# Patient Record
Sex: MALE | Race: WHITE | HISPANIC OR LATINO | Employment: STUDENT | URBAN - METROPOLITAN AREA
[De-identification: names, ages, dates, MRNs, and addresses within clinical notes are randomized per-mention and may not be internally consistent; named-entity substitution may affect disease eponyms.]

---

## 2018-02-25 ENCOUNTER — OFFICE VISIT (OUTPATIENT)
Dept: URGENT CARE | Facility: CLINIC | Age: 18
End: 2018-02-25
Payer: MEDICAID

## 2018-02-25 VITALS
TEMPERATURE: 99 F | HEIGHT: 67 IN | DIASTOLIC BLOOD PRESSURE: 83 MMHG | BODY MASS INDEX: 36.1 KG/M2 | RESPIRATION RATE: 18 BRPM | OXYGEN SATURATION: 97 % | SYSTOLIC BLOOD PRESSURE: 138 MMHG | HEART RATE: 87 BPM | WEIGHT: 230 LBS

## 2018-02-25 DIAGNOSIS — S89.92XA KNEE INJURY, LEFT, INITIAL ENCOUNTER: Primary | ICD-10-CM

## 2018-02-25 PROCEDURE — 3008F BODY MASS INDEX DOCD: CPT | Mod: S$GLB,,, | Performed by: NURSE PRACTITIONER

## 2018-02-25 PROCEDURE — 99203 OFFICE O/P NEW LOW 30 MIN: CPT | Mod: S$GLB,,, | Performed by: NURSE PRACTITIONER

## 2018-02-25 RX ORDER — IBUPROFEN 600 MG/1
600 TABLET ORAL EVERY 6 HOURS PRN
Qty: 20 TABLET | Refills: 0 | Status: SHIPPED | OUTPATIENT
Start: 2018-02-25

## 2018-02-25 NOTE — PROGRESS NOTES
"Subjective:       Patient ID: José mcnair is a 17 y.o. male.    Vitals:  height is 5' 7" (1.702 m) and weight is 104.3 kg (230 lb). His tympanic temperature is 99.1 °F (37.3 °C). His blood pressure is 138/83 and his pulse is 87. His respiration is 18 and oxygen saturation is 97%.     Chief Complaint: Trauma and Knee Pain (1 hr)    Pt c/o leg becoming stiff and falling onto left knee forward.  No other injury.  States that he heard/felt a pop sensation twice.  No ankle or hip pain.  Pt has not previously injured this leg.  Here with his mom.  This did occur at work but patient is refusing workman's comp at this time.      Knee Injury   This is a new problem. The current episode started today. The problem occurs constantly. The problem has been unchanged. Associated symptoms include joint swelling. Pertinent negatives include no abdominal pain, chest pain, neck pain, numbness, vomiting or weakness. The symptoms are aggravated by standing and walking. He has tried nothing for the symptoms.     Review of Systems   Constitution: Negative for weakness and malaise/fatigue.   HENT: Negative for nosebleeds.    Cardiovascular: Negative for chest pain and syncope.   Respiratory: Negative for shortness of breath.    Musculoskeletal: Positive for joint pain, joint swelling and stiffness. Negative for back pain and neck pain.   Gastrointestinal: Negative for abdominal pain and vomiting.   Genitourinary: Negative for hematuria.   Neurological: Negative for dizziness and numbness.       Objective:      Physical Exam   Constitutional: He is oriented to person, place, and time. He appears well-developed and well-nourished. He is cooperative.  Non-toxic appearance. He does not appear ill. No distress.   HENT:   Head: Normocephalic and atraumatic.   Right Ear: Hearing and external ear normal.   Left Ear: Hearing and external ear normal.   Nose: Nose normal.   Mouth/Throat: Uvula is midline, oropharynx is clear and moist and " mucous membranes are normal.   Eyes: Conjunctivae and lids are normal. Right eye exhibits no discharge. Left eye exhibits no discharge. No scleral icterus.   Neck: Trachea normal, normal range of motion, full passive range of motion without pain and phonation normal. Neck supple.   Cardiovascular: Normal rate, regular rhythm, normal heart sounds, intact distal pulses and normal pulses.    Pulmonary/Chest: Effort normal and breath sounds normal. No respiratory distress.   Abdominal: Normal appearance and bowel sounds are normal.   Musculoskeletal: He exhibits no edema or deformity.        Left knee: He exhibits decreased range of motion and swelling. He exhibits no effusion, no ecchymosis, no deformity, no laceration, no erythema, normal alignment, no LCL laxity, normal patellar mobility, no bony tenderness, normal meniscus and no MCL laxity. Tenderness found.        Legs:  Neurological: He is alert and oriented to person, place, and time. He has normal strength. No cranial nerve deficit or sensory deficit. He exhibits normal muscle tone. Coordination and gait normal.   Antalgic gait   Skin: Skin is warm, dry and intact. No abrasion, no bruising and no ecchymosis noted. He is not diaphoretic. No pallor.   Psychiatric: He has a normal mood and affect. His speech is normal and behavior is normal. Judgment and thought content normal. Cognition and memory are normal.   Nursing note and vitals reviewed.      Crutches and ace wrap performed in clinic.  Xray down at this time.  Pt going to [place] location for xray.  Will call with result.    Narrative     Knee 3 view left    Clinical history: Fall    Comparison: None    Findings:  3 views.    There is a subtle linear lucency involving the lateral tibial spine, best seen on frontal view, not confirmed on orthogonal view, fracture is not excluded, clinical correlation is advised.  There may be a minimal suprapatellar effusion versus positioning.   Impression       As  above Impression:  As above      Electronically signed by: DEBORAH GUTHRIE MD  Date: 02/25/18  Time: 16:52      Pt returned to clinic for knee immobilizer for possible fracture seen on xray.  Pt given cd for follow up.  He has appt with his PCP tomorrow.  Assessment:       1. Knee injury, left, initial encounter        Plan:         Knee injury, left, initial encounter  -     Cancel: X-Ray Knee 3 View Left; Future; Expected date: 02/25/2018  -     CRUTCHES FOR HOME USE  -     ibuprofen (ADVIL,MOTRIN) 600 MG tablet; Take 1 tablet (600 mg total) by mouth every 6 (six) hours as needed for Pain.  Dispense: 20 tablet; Refill: 0  -     Ambulatory referral to Orthopedics  -     X-Ray Knee 3 View Left; Future; Expected date: 02/25/2018      Patient Instructions   Please go directly to [facility] at [address] [place] [address] for xrays.      Call [phone] to confirm appointment for referral to ortho.  If you are unable to get in with [facility] please follow up through your PCP.  Do not take any otc NSAIDs with the Ibuprofen prescribed to you.      Please drink plenty of fluids.  Please get plenty of rest.  Please return here or go to the Emergency Department for any concerns or worsening of condition.  If you were prescribed a narcotic medication, do not drive or operate heavy equipment or machinery while taking these medications.  If you were not prescribed an anti-inflammatory medication, and if you do not have any history of stomach/intestinal ulcers, or kidney disease, or are not taking a blood thinner such as Coumadin, Plavix, Pradaxa, Eloquis, or Xaralta for example, it is OK to take over the counter Ibuprofen or Advil or Motrin or Aleve as directed.  Do not take these medications on an empty stomach.  Rest, ice, compression and elevation to the affected joint or limb as needed.  Please follow up with your primary care doctor or specialist as needed.    If you  smoke,  please stop smoking.  Knee Sprain    A sprain is an injury to the ligaments or capsule that holds a joint together. There are no broken bones. Most sprains take 3 to 6 weeks to heal. If it a severe sprain where the ligament is completely torn, it can take months to recover.  Most knee sprains are treated with a splint, knee immobilizer brace, or elastic wrap for support. Severe sprains may require surgery.  Home care  · Stay off the injured leg as much as possible until you can walk on it without pain. If you have a lot of pain with walking, crutches or a walker may be prescribed. (These can be rented or purchased at many pharmacies and surgical or orthopedic supply stores). Follow your healthcare provider's advice about when to begin putting weight on that leg.  · Keep your leg elevated to reduce pain and swelling. When sleeping, place a pillow under the injured leg. When sitting, support the injured leg so it is level with your waist. This is very important during the first 48 hours.  · Apply an ice pack over the injured area for 15 to 20 minutes every 3 to 6 hours. You should do this for the first 24 to 48 hours. You can make an ice pack by filling a plastic bag that seals at the top with ice cubes and then wrapping it with a thin towel. Continue to use ice packs for relief of pain and swelling as needed. As the ice melts, be careful to avoid getting your wrap, splint, or cast wet. After 48 hours, apply heat (warm shower or warm bath) for 15 to 20 minutes several times a day, or alternate ice and heat. You can place the ice pack directly over the splint. If you have to wear a hook-and-loop knee brace, you can open it to apply the ice pack, or heat, directly to the knee. Never put ice directly on the skin. Always wrap the ice in a towel or other type of cloth.  · You may use over-the-counter pain medicine to control pain, unless another pain medicine was prescribed.If you have chronic liver or kidney disease or  ever had a stomach ulcer or GI bleeding, talk with your healthcare provider before using these medicines.  · If you were given a splint, keep it completely dry at all times. Bathe with your splint out of the water, protected with 2 large plastic bags, rubber-banded at the top end. If a fiberglass splint gets wet, you can dry it with a hair dryer. If you have a hook-and-loop knee brace, you can remove this to bathe, unless told otherwise.  Follow-up care  Follow up with your doctor as advised. Any X-rays you had today dont show any broken bones, breaks, or fractures. Sometimes fractures dont show up on the first X-ray. Bruises and sprains can sometimes hurt as much as a fracture. These injuries can take time to heal completely. If your symptoms dont improve or they get worse, talk with your doctor. You may need a repeat X-ray. If X-rays were taken, you will be told of any new findings that may affect your care.  Call 911  Call 911 if you have:  ·  Shortness of breath  ·  Chest pain  When to seek medical advice  Call your healthcare provider right away if any of these occur:  · The splint or knee immobilizer brace becomes wet or soft  · The fiberglass cast or splint remains wet for more than 24 hours  · Pain or swelling increases  · The injured leg or toes become cold, blue, numb, or tingly  Date Last Reviewed: 11/20/2015  © 2101-3562 [employer]. [address] [place] [address], [place] [address]. All rights reserved. This information is not intended as a substitute for professional medical care. Always follow your healthcare professional's instructions.

## 2018-02-25 NOTE — PATIENT INSTRUCTIONS
Please go directly to [facility] at [address] [address] for xrays.      Call [phone] to confirm appointment for referral to ortho.  If you are unable to get in with [facility] please follow up through your PCP.  Do not take any otc NSAIDs with the Ibuprofen prescribed to you.      Please drink plenty of fluids.  Please get plenty of rest.  Please return here or go to the Emergency Department for any concerns or worsening of condition.  If you were prescribed a narcotic medication, do not drive or operate heavy equipment or machinery while taking these medications.  If you were not prescribed an anti-inflammatory medication, and if you do not have any history of stomach/intestinal ulcers, or kidney disease, or are not taking a blood thinner such as Coumadin, Plavix, Pradaxa, Eloquis, or Xaralta for example, it is OK to take over the counter Ibuprofen or Advil or Motrin or Aleve as directed.  Do not take these medications on an empty stomach.  Rest, ice, compression and elevation to the affected joint or limb as needed.  Please follow up with your primary care doctor or specialist as needed.    If you  smoke, please stop smoking.  Knee Sprain    A sprain is an injury to the ligaments or capsule that holds a joint together. There are no broken bones. Most sprains take 3 to 6 weeks to heal. If it a severe sprain where the ligament is completely torn, it can take months to recover.  Most knee sprains are treated with a splint, knee immobilizer brace, or elastic wrap for support. Severe sprains may require surgery.  Home care  · Stay off the injured leg as much as possible until you can walk on it without pain. If you have a lot of pain with walking, crutches or a walker may be prescribed. (These can be rented or purchased at many pharmacies and surgical or orthopedic supply stores). Follow your healthcare provider's advice about when to begin putting weight on that  leg.  · Keep your leg elevated to reduce pain and swelling. When sleeping, place a pillow under the injured leg. When sitting, support the injured leg so it is level with your waist. This is very important during the first 48 hours.  · Apply an ice pack over the injured area for 15 to 20 minutes every 3 to 6 hours. You should do this for the first 24 to 48 hours. You can make an ice pack by filling a plastic bag that seals at the top with ice cubes and then wrapping it with a thin towel. Continue to use ice packs for relief of pain and swelling as needed. As the ice melts, be careful to avoid getting your wrap, splint, or cast wet. After 48 hours, apply heat (warm shower or warm bath) for 15 to 20 minutes several times a day, or alternate ice and heat. You can place the ice pack directly over the splint. If you have to wear a hook-and-loop knee brace, you can open it to apply the ice pack, or heat, directly to the knee. Never put ice directly on the skin. Always wrap the ice in a towel or other type of cloth.  · You may use over-the-counter pain medicine to control pain, unless another pain medicine was prescribed.If you have chronic liver or kidney disease or ever had a stomach ulcer or GI bleeding, talk with your healthcare provider before using these medicines.  · If you were given a splint, keep it completely dry at all times. Bathe with your splint out of the water, protected with 2 large plastic bags, rubber-banded at the top end. If a fiberglass splint gets wet, you can dry it with a hair dryer. If you have a hook-and-loop knee brace, you can remove this to bathe, unless told otherwise.  Follow-up care  Follow up with your doctor as advised. Any X-rays you had today dont show any broken bones, breaks, or fractures. Sometimes fractures dont show up on the first X-ray. Bruises and sprains can sometimes hurt as much as a fracture. These injuries can take time to heal completely. If your symptoms dont improve  or they get worse, talk with your doctor. You may need a repeat X-ray. If X-rays were taken, you will be told of any new findings that may affect your care.  Call 911  Call 911 if you have:  ·  Shortness of breath  ·  Chest pain  When to seek medical advice  Call your healthcare provider right away if any of these occur:  · The splint or knee immobilizer brace becomes wet or soft  · The fiberglass cast or splint remains wet for more than 24 hours  · Pain or swelling increases  · The injured leg or toes become cold, blue, numb, or tingly  Date Last Reviewed: 11/20/2015 © 2000-2017 [employer]. [address] [place] [address], [place] [address]. All rights reserved. This information is not intended as a substitute for professional medical care. Always follow your healthcare professional's instructions.

## 2018-02-28 ENCOUNTER — TELEPHONE (OUTPATIENT)
Dept: URGENT CARE | Facility: CLINIC | Age: 18
End: 2018-02-28

## 2018-02-28 ENCOUNTER — OFFICE VISIT (OUTPATIENT)
Dept: ORTHOPEDICS | Facility: CLINIC | Age: 18
End: 2018-02-28
Payer: MEDICAID

## 2018-02-28 VITALS — WEIGHT: 230 LBS | BODY MASS INDEX: 36.1 KG/M2 | HEIGHT: 67 IN

## 2018-02-28 DIAGNOSIS — S80.02XA CONTUSION OF LEFT KNEE, INITIAL ENCOUNTER: ICD-10-CM

## 2018-02-28 PROCEDURE — 99212 OFFICE O/P EST SF 10 MIN: CPT | Mod: PBBFAC | Performed by: NURSE PRACTITIONER

## 2018-02-28 PROCEDURE — 99999 PR PBB SHADOW E&M-EST. PATIENT-LVL II: CPT | Mod: PBBFAC,,, | Performed by: NURSE PRACTITIONER

## 2018-02-28 PROCEDURE — 99203 OFFICE O/P NEW LOW 30 MIN: CPT | Mod: S$PBB,,, | Performed by: NURSE PRACTITIONER

## 2018-02-28 NOTE — LETTER
February 28, 2018      Yamel Myers MD  [address]  [place] [address]           [facility]  [address] [place] [address]  Phone: [phone]          Patient: José mcnair   MR Number: [id number]   YOB: 2000   Date of Visit: 2/28/2018       Dear Dr. Yamel Myers:    Thank you for referring José mcnair to me for evaluation. Attached you will find relevant portions of my assessment and plan of care.    If you have questions, please do not hesitate to call me. I look forward to following José mcnair along with you.    Sincerely,    Latha Whiting, TERRELL    Enclosure  CC:  No Recipients    If you would like to receive this communication electronically, please contact [email or web address] or [phone] to request more information on [employer] access.    For providers and/or their staff who would like to refer a patient to [facility], please contact us through our one-stop-shop provider referral line, [facility], at [phone].    If you feel you have received this communication in error or would no longer like to receive these types of communications, please e-mail [email or web address]

## 2018-02-28 NOTE — PROGRESS NOTES
sSubjective:      Patient ID: José mcnair is a 17 y.o. male.    Chief Complaint: Knee Injury (lt knee fx/fell at work)    On February 25, 2018 patient was moving clothes and his left knee hit a table and his knee locked up and fell onto his left knee.  He was seen in the ER and placed in a knee immobilizer for a suspected fracture.  He is here for evaluation and treatment.        Review of patient's allergies indicates:  No Known Allergies    History reviewed. No pertinent past medical history.  History reviewed. No pertinent surgical history.  Family History   Problem Relation Age of Onset    No Known Problems Mother     Hypertension Father     Hyperlipidemia Father        Current Outpatient Prescriptions on File Prior to Visit   Medication Sig Dispense Refill    ibuprofen (ADVIL,MOTRIN) 600 MG tablet Take 1 tablet (600 mg total) by mouth every 6 (six) hours as needed for Pain. 20 tablet 0     No current facility-administered medications on file prior to visit.        Social History     Social History Narrative    Mom, sister and grandmother       Review of Systems   Constitution: Negative for chills and fever.   HENT: Negative for congestion.    Eyes: Negative for discharge.   Cardiovascular: Negative for chest pain.   Respiratory: Negative for cough.    Skin: Negative for rash.   Musculoskeletal: Positive for joint pain. Negative for joint swelling.   Gastrointestinal: Negative for abdominal pain and bowel incontinence.   Genitourinary: Negative for bladder incontinence.   Neurological: Negative for headaches, numbness and paresthesias.   Psychiatric/Behavioral: The patient is not nervous/anxious.          Objective:      General    Development well-developed   Nutrition well-nourished   Body Habitus normal weight   Mood no distress    Speech normal    Tone normal        Spine    Tone tone             Vascular Exam  Dorsalis Pectus pulse Right 2+ Left 2+         Lower  Hip  Tests Right negative  FADIR test    Left negative FADIR test        Knee  Tenderness Right no tenderness    Left patella   Range of Motion Flexion:   Right normal    Left normal   Extension:   Right normal    Left (Normal degrees)    Stability no Right Knee Pain        no Left Knee Unstable          Muscle Strength normal right knee strength   normal left knee strength    Alignment Right valgus   Left valgus   Tests Right no hamstring tightness     Left no hamstring tightness      Swelling Right no swelling    Left no swelling             Extremity  Gait antalgic   Tone Right normal Left Normal   Skin Right normal    Left normal    Sensation Right normal  Left normal   Pulse Right 2+  Left 2+               X-rays done and images viewed by me show no fractures or dislocations.       Assessment:       1. Contusion of left knee, initial encounter           Plan:       RICE principles reviewed.  Questions answered and written information provided.    Follow-up if symptoms worsen or fail to improve.

## 2018-02-28 NOTE — LETTER
February 28, 2018      [facility]  [address]  [facility] [address]  Phone: [phone]       Patient: José mcnair   YOB: 2000  Date of Visit: 02/28/2018    To Whom It May Concern:    Roxana mcnair  was at [facility] on 02/28/2018. He may return to work/school on 03/03/2018 with no restrictions. If you have any questions or concerns, or if I can be of further assistance, please do not hesitate to contact me.    Sincerely,    Dayna Madison

## 2018-04-25 ENCOUNTER — OFFICE VISIT (OUTPATIENT)
Dept: PEDIATRIC ENDOCRINOLOGY | Facility: CLINIC | Age: 18
End: 2018-04-25
Payer: MEDICAID

## 2018-04-25 VITALS
WEIGHT: 239.19 LBS | BODY MASS INDEX: 38.44 KG/M2 | SYSTOLIC BLOOD PRESSURE: 118 MMHG | HEIGHT: 66 IN | HEART RATE: 97 BPM | DIASTOLIC BLOOD PRESSURE: 78 MMHG

## 2018-04-25 DIAGNOSIS — E66.9 BMI (BODY MASS INDEX) PEDIATRIC, > 99% FOR AGE, OBESE CHILD, TERTIARY CARE INTERVENTION: Primary | ICD-10-CM

## 2018-04-25 DIAGNOSIS — L83 ACANTHOSIS NIGRICANS: ICD-10-CM

## 2018-04-25 PROCEDURE — 99214 OFFICE O/P EST MOD 30 MIN: CPT | Mod: S$PBB,,, | Performed by: PEDIATRICS

## 2018-04-25 PROCEDURE — 99214 OFFICE O/P EST MOD 30 MIN: CPT | Mod: PBBFAC

## 2018-04-25 PROCEDURE — 99999 PR PBB SHADOW E&M-EST. PATIENT-LVL IV: CPT | Mod: PBBFAC,,,

## 2018-04-25 NOTE — PATIENT INSTRUCTIONS
"Nutrition Plan:  1. Breakfast daily: lean protein + whole grain carbohydrates + fruits   a. Lean protein: eggs, egg white, sliced deli meat, peanut butter, Gage jeffers, low-fat cheese, low fat yogurt  b. Whole grain carbohydrates: wheat toast/English muffin/pancakes/waffles, fruit, cereals  c. Low sugar cereals: corn flakes, rice Krispy, oatmeal squares, kix   d. NOTES:  Focus on having fruits with breakfast daily    2. Healthy snacks: 1-2x/day, 150 calories include fruit, vegetable or low fat dairy     A. NOTES: Check nutrition fact label for serving size and calories to make smart snack choices     3. Zero calorie beverages: Water, Crystal light, Sugar free punch, Diet soda, G2, PowerAde Zero, Skim or 1%milk    4. Healthy plate method using proper portions   a. Use fist to measure vegetables and starch and use palm to measure meats  b. Decrease high calorie high fat foods like avocado, cheese, eggs  c. Use healthy cooking techniques like baking, stewing roasting, grilling. Avoid frying or excessive fats like butter or oils   d. NOTES: Keep portions appropriate with one palm meat, one fist ( 1 c ) starch, and two fists fruits or vegetables ( 2c)   e. Limit intake of high fat meats like jeffers, sausage, bologna, salami, fried chicken, nuggets, fast food burgers, etc - 10% or 3x/month     5. Round out fast food to look like the healthy plate!  a. Skip the fries and the sugary drink and head home for salad, steamable vegetables and a zero calorie beverage  b. Keep intake 400-450 calories or less when eating fast foods     6. Add Multivitamin ONCE daily - One a Day Men's health     7. Physical activity: Ensure 60+ mins "out of breath" activity daily   a. Three must haves: 1. Heart pumping 2. Sweating! 3. Breathing heavy  b. Ensure 60 mins daily       Track calories daily, limiting to 3734-2462 daily   Use [employer] web site or [employer]co to track daily intake          Follow up with Gabby Le- Adult RD "   [phone]      Sheeba Wolfe RD, LDN  Pediatric Dietitian  [facility]   [phone]      Get fasting labs this week- nothing to eat or drink after midnight.

## 2018-04-25 NOTE — PROGRESS NOTES
"Referring Physician:Yamel Myers MD     Reason for Visit: Obesity         A = Nutrition Assessment  Anthropometric Data Wt:108.5 kg (239 lb 3.2 oz)    Ht:5' 6.26" (1.683 m)     IBW:64.5kg ( 186%IBW)                    BMI :Body mass index is 38.31 kg/m².    (>95%ile)                 Biochemical Data Labs:No new labs   Meds:None    Dietary Data  Appetite:Large, disordered, unbalanced    Dietary Intake:   Breakfast:   Skips or ham and cheese sandwich    Lunch:   Fast food    Dinner:   Meat, rice, beans    Snacks:   Cookies    Other Data:  :2000  Supplements/ MVI:NOne                        PAL: Sedentary      D = Nutrition Diagnosis  Patient Assessment: José is at nutrition risk 2/2 obesity with BMI >95%ile. Patient is nearly 19 y/o and was accidentally scheduled into clinic. Patient has been previously seen in 2013 and had full year of follow up and education on healthy eating plan. Per diet recall, diet remains high in fat and sugar and low in fruit/vegetable/whole grain intake. Activity level is sedentary. Discussed at length disordered eating pattern and need to ensure regular meals and snacks throughout the day ensuring appropriate metaboilic function aiding in goal weight loss. Session was spent educating family on portion control, healthy eating, and limiting sugar containing drinks. Stressed the importance of using the healthy plate method to build a well balanced, properly portioned meals daily. Parent stated patient eats foods from outside of the home daily. Reviewed with family ways to improve choices when choosing fast food or convenience foods and provided very specific guidelines with regard to calorie intake when choosing fast foods. Provided patient with [employer] nimco as resource for determining calorie content of foods prior to eating to ensure better choices  Also instructed family on reading nutrition fact labels for serving sizes and calories to ensure smart snack choices. " Patient with questions regarding portions which were reviewed in depth. Also provided specific information regarding calorie restriction and daily calorie intake goals. Provided patient with resource st dustin to trak calories daily to ensure appropriate weight loss.  Discussed need to increase physical activity and discussed ways to include it daily. Also, reviewed with patient difference between physical activity and activities of daily living to ensure patient getting full extent of exercise neccessary to facilitate good weight loss. Patient and parents clearly cognizant of problem and noting behaviors needing improvement. Patient active and engaged during session And did verbalized desire to make changes. Concluded session with goal setting of 15% reduction in body ( 36#) over six months as initial goal to significantly reduce risk level for development of diseases inclduing HTN, DM, abnormal lipid levels, sleep apnea, etc. Contact information provided, understanding verbalized and compliance expected.    Primary Problem: Obesity  Etiology: Related to excessive calorie intake 2/2 frequent consumption high calorie foods/drinks   Signs/symptoms: As evidenced by diet recall and BMI>95%ile    Education Materials Provided:   1. Healthy Plate method   2. Hand sized portion guide   3. Local fast food guide   4. Calorie intake goals        I = Nutrition Intervention  Calorie Requirements:1500-1800kcal/day (14-16Kcal/kgIBW- DRI, Wt loss)  Protein requirements: 65g/day (1g/kgIBW- DRI, Wt loss)   Recommendation #1 Track calorie intake daily, enuring no jeferson than 1500-1800kcal daily intake and  using tracking web site or nimco for compliance    Recommendation #2 Drinks zero calorie beverages only including water, crystal light, unsweet tea, diet soda, G2, Powerade zero, vitamin water zero, and skim/1%milk   Recommendation #3 Choose healthy snacks 100-150 calories including fruits, vegetables or low-fat dairy; Limit to 1-2x/day        Recommendation #4 Use healthy plate method for dinner with proper portions sizing, using body (fist, palm, ect) as a guide; use measuring cups to ensure proper portions and no seconds allowed    Recommendation #5  Discussed rounding out fast food to comply with healthy plate. Avoid fried foods and high calories beverages and limit intake to 450kcal per meal when choosing convenience foods    Recommendation #6 Increase physical activity to 60+ mins daily    M = Nutrition Monitoring   Indicator 1. Weight   Indicator 2.  Diet Recall     E= Nutrition Evaluation  Goal 1. Weight loss 6#/month    Goal 2. Diet recall shows decrease in high calorie foods/drinks      Consultation Time:30 Minutes  F/U: F/U with Adult RD only

## 2018-04-25 NOTE — PROGRESS NOTES
José wade jesus is being seen in the pediatric endocrinology clinic today at the request of mauro Barillas for evaluation of Weight Gain  .    HPI: José is a 17 y.o. male presenting with weight gain since age 13y. He has seen our dietician a few times. He reports he does not feel like his weight has been a problem. He has gained 63lbs since he last saw the dietician. He is open to making changes now. He denies associated symptoms of diabetes such as polyuria, polydipsia and nocturia. He does not have any nausea or vomiting. His BP was initially high in clinic but normal when repeated manually.     Exercise: no structured exercise. He is a manager at a clothing store  Screen: 3hrs  Drinks: [place] tea, juice, soda, water, gatorade  Dining Out- 2-3 time/wk      ROS:  Constitutional: Negative for fever.   HENT: Negative for congestion and sore throat.    Eyes: Negative for discharge and redness.   Respiratory: Negative for cough and shortness of breath.    Cardiovascular: Negative for chest pain.   Gastrointestinal: Negative for nausea and vomiting.   Musculoskeletal: Negative for myalgias.   Skin: Negative for rash.   Neurological: Negative for headaches.   Psychiatric/Behavioral: Negative for behavioral problems.   Endocrine: see HPI and negative for - nocturia, polyuria, polydispia    Past Medical/Surgical/Family History:  No birth history on file.    No past medical history on file.    Family History   Problem Relation Age of Onset    No Known Problems Mother     Hypertension Father     Hyperlipidemia Father        No history of diabetes, thyroid or adrenal disease. No other history autoimmune disease or endocrinopathies in the family. No short stature or delayed or early puberty.    No past surgical history on file.    Social History:  Social History     Social History Narrative    Mom, sister and grandmother       Medications:  Current Outpatient Prescriptions   Medication Sig    ibuprofen (ADVIL,MOTRIN)  "600 MG tablet Take 1 tablet (600 mg total) by mouth every 6 (six) hours as needed for Pain.     No current facility-administered medications for this visit.        Allergies:  Review of patient's allergies indicates:  No Known Allergies    Physical Exam:   BP (!) 140/84   Pulse 97   Ht 5' 6.26" (1.683 m)   Wt 108.5 kg (239 lb 3.2 oz)   BMI 38.31 kg/m²   body surface area is 2.25 meters squared.    General: alert, active, in no acute distress  Skin: normal tone and texture, no rashes  Head:  atraumatic and normocephalic  Eyes:  Conjunctivae are normal, pupils equal and reactive to light, extraocular movements intact  Throat:  moist mucous membranes without erythema, exudates or petechiae  Neck:  supple, no lymphadenopathy, no thyromegaly, +hyperpigmentation on back of neck  Lungs: Effort normal and breath sounds normal.   Heart:  regular rate and rhythm, no edema  Abdomen:  Abdomen soft, non-tender. No masses or hepatosplenomegaly   Neuro: gross motor exam normal by observation, DTR at patella 2+  Musculoskeletal:  Normal range of motion, gait normal      Labs:    Impression/Recommendations:   José is a 17 y.o. male being seen as a new patient today by pediatric endocrinology for acanthosis nigricans and obesity. We will get fasting labs this week to screen for diabetes, hyperlipdemia and hypothyroidism. He will need to follow up with an adult provider regarding weight loss and other options available to him.   I referred him back to the pcp to help with setting up these appts. Both he and his mom verbalized understanding.     The history and physical exam are not suggestive of secondary causes of obesity such as hypercortisolism. His thyroid function tests were normal.     -Discussed potential for co-morbidities of obesity (DM, hypertension, heart disease) at length with mother  -Discussed the possibility of prevention/reversal of these complications with improvement in lifestyle  -Discussed healthy lifestyle " "changes: making better food choices, portion control, increasing activity time and intensity         -Advised decreasing consumption of sugary beverages (juice, teas, soda) and to drink more water and only nonfat milk         -Choose healthy snacks (fruits, vegetables)         -Cut back on "eating out"         -Try to eat breakfast daily         -Increase time spent in active play or exercising (at least 1/2 - 1 hour per day)    -Referral to Nutrition for assistance in dietary changes      It was a pleasure to see your patient in clinic today. Please call with any questions or concerns.    "

## 2018-05-01 ENCOUNTER — LAB VISIT (OUTPATIENT)
Dept: LAB | Facility: HOSPITAL | Age: 18
End: 2018-05-01
Attending: NURSE PRACTITIONER
Payer: MEDICAID

## 2018-05-01 DIAGNOSIS — E66.9 BMI (BODY MASS INDEX) PEDIATRIC, > 99% FOR AGE, OBESE CHILD, TERTIARY CARE INTERVENTION: ICD-10-CM

## 2018-05-01 DIAGNOSIS — L83 ACANTHOSIS NIGRICANS: ICD-10-CM

## 2018-05-01 LAB
ALBUMIN SERPL BCP-MCNC: 3.8 G/DL
ALP SERPL-CCNC: 96 U/L
ALT SERPL W/O P-5'-P-CCNC: 77 U/L
ANION GAP SERPL CALC-SCNC: 7 MMOL/L
AST SERPL-CCNC: 31 U/L
BILIRUB SERPL-MCNC: 0.5 MG/DL
BUN SERPL-MCNC: 13 MG/DL
CALCIUM SERPL-MCNC: 9.3 MG/DL
CHLORIDE SERPL-SCNC: 106 MMOL/L
CHOLEST SERPL-MCNC: 177 MG/DL
CHOLEST/HDLC SERPL: 5.2 {RATIO}
CO2 SERPL-SCNC: 25 MMOL/L
CREAT SERPL-MCNC: 0.9 MG/DL
EST. GFR  (AFRICAN AMERICAN): >60 ML/MIN/1.73 M^2
EST. GFR  (NON AFRICAN AMERICAN): >60 ML/MIN/1.73 M^2
ESTIMATED AVG GLUCOSE: 103 MG/DL
GLUCOSE SERPL-MCNC: 100 MG/DL
HBA1C MFR BLD HPLC: 5.2 %
HDLC SERPL-MCNC: 34 MG/DL
HDLC SERPL: 19.2 %
LDLC SERPL CALC-MCNC: 112.8 MG/DL
NONHDLC SERPL-MCNC: 143 MG/DL
POTASSIUM SERPL-SCNC: 4.3 MMOL/L
PROT SERPL-MCNC: 7.3 G/DL
SODIUM SERPL-SCNC: 138 MMOL/L
TRIGL SERPL-MCNC: 151 MG/DL
TSH SERPL DL<=0.005 MIU/L-ACNC: 2.36 UIU/ML

## 2018-05-01 PROCEDURE — 84443 ASSAY THYROID STIM HORMONE: CPT

## 2018-05-01 PROCEDURE — 36415 COLL VENOUS BLD VENIPUNCTURE: CPT | Mod: PO

## 2018-05-01 PROCEDURE — 83036 HEMOGLOBIN GLYCOSYLATED A1C: CPT

## 2018-05-01 PROCEDURE — 80061 LIPID PANEL: CPT

## 2018-05-01 PROCEDURE — 80053 COMPREHEN METABOLIC PANEL: CPT

## 2018-10-08 ENCOUNTER — OFFICE VISIT (OUTPATIENT)
Dept: CARDIOLOGY | Facility: CLINIC | Age: 18
End: 2018-10-08
Payer: MEDICAID

## 2018-10-08 VITALS
HEART RATE: 77 BPM | BODY MASS INDEX: 38.13 KG/M2 | WEIGHT: 238.13 LBS | OXYGEN SATURATION: 99 % | RESPIRATION RATE: 20 BRPM | DIASTOLIC BLOOD PRESSURE: 84 MMHG | SYSTOLIC BLOOD PRESSURE: 122 MMHG

## 2018-10-08 DIAGNOSIS — E66.9 BMI (BODY MASS INDEX) PEDIATRIC, > 99% FOR AGE, OBESE CHILD, TERTIARY CARE INTERVENTION: ICD-10-CM

## 2018-10-08 DIAGNOSIS — Z82.49 FAMILY HISTORY OF CORONARY ARTERY DISEASE: ICD-10-CM

## 2018-10-08 DIAGNOSIS — J45.20 MILD INTERMITTENT ASTHMA WITHOUT COMPLICATION: ICD-10-CM

## 2018-10-08 DIAGNOSIS — R07.9 CHEST PAIN, UNSPECIFIED TYPE: Primary | ICD-10-CM

## 2018-10-08 DIAGNOSIS — R07.9 CHEST PAIN: ICD-10-CM

## 2018-10-08 PROCEDURE — 99204 OFFICE O/P NEW MOD 45 MIN: CPT | Mod: S$PBB,,, | Performed by: INTERNAL MEDICINE

## 2018-10-08 PROCEDURE — 93005 ELECTROCARDIOGRAM TRACING: CPT | Mod: PBBFAC,PO | Performed by: INTERNAL MEDICINE

## 2018-10-08 PROCEDURE — 99213 OFFICE O/P EST LOW 20 MIN: CPT | Mod: PBBFAC,PO,25 | Performed by: INTERNAL MEDICINE

## 2018-10-08 PROCEDURE — 93010 ELECTROCARDIOGRAM REPORT: CPT | Mod: S$PBB,,, | Performed by: INTERNAL MEDICINE

## 2018-10-08 PROCEDURE — 99999 PR PBB SHADOW E&M-EST. PATIENT-LVL III: CPT | Mod: PBBFAC,,, | Performed by: INTERNAL MEDICINE

## 2018-10-08 RX ORDER — ALBUTEROL SULFATE 0.83 MG/ML
2.5 SOLUTION RESPIRATORY (INHALATION) EVERY 6 HOURS PRN
COMMUNITY

## 2018-10-08 NOTE — PROGRESS NOTES
Subjective:    Patient ID:  José mcnair is a 18 y.o. male who presents for evaluation of Chest Pain (2 weeks ago)      HPI  Patient is here for evaluation chest pain.  Says substernal and feels like a tightening across his chest.  He could be said there doing absolutely nothing it comes on.  He is not feeling stressed or anxious.  He says not after meals.  It is somewhat positional in nature but he has not taking anything makes it better or worse.  He denies any PND, orthopnea or lower extremity edema.  He has never experienced any dizziness, presyncope or syncope.  He does not exercise regularly but does do heavy activity at work including lifting boxes.    Review of Systems   Constitution: Negative.   HENT: Negative.    Eyes: Negative.    Cardiovascular: Positive for chest pain. Negative for dyspnea on exertion, irregular heartbeat, leg swelling, near-syncope, orthopnea, palpitations, paroxysmal nocturnal dyspnea and syncope.   Respiratory: Negative for shortness of breath.    Skin: Negative.    Musculoskeletal: Negative.    Gastrointestinal: Negative for abdominal pain, constipation and diarrhea.   Genitourinary: Negative for dysuria.   Neurological: Negative for dizziness.   Psychiatric/Behavioral: Negative.      Past Medical History:   Diagnosis Date    Asthma     Obesity    History reviewed. No pertinent surgical history.   Social History     Tobacco Use    Smoking status: Never Smoker    Smokeless tobacco: Never Used   Substance Use Topics    Alcohol use: No    Drug use: No     Family History   Problem Relation Age of Onset    No Known Problems Mother     Hypertension Father     Hyperlipidemia Father     Diabetes Maternal Grandmother     Crohn's disease Maternal Grandmother     Fibromyalgia Maternal Grandmother     Diabetes Paternal Grandmother     Hyperlipidemia Paternal Grandmother     Hypertension Paternal Grandmother     Hyperlipidemia Paternal Grandfather     Diabetes Paternal  Grandfather     Hypertension Paternal Grandfather         Objective:    Physical Exam   Constitutional: He is oriented to person, place, and time. He appears well-developed and well-nourished. No distress.   HENT:   Head: Normocephalic and atraumatic.   Eyes: Conjunctivae and EOM are normal. Pupils are equal, round, and reactive to light.   Neck: Normal range of motion. Neck supple. No thyromegaly present.   Cardiovascular: Normal rate, regular rhythm and normal heart sounds.   No murmur heard.  Pulmonary/Chest: Effort normal and breath sounds normal. No respiratory distress. He has no wheezes. He has no rales. He exhibits no tenderness.   Abdominal: Soft. Bowel sounds are normal.   Musculoskeletal: He exhibits no edema.   Neurological: He is alert and oriented to person, place, and time.   Skin: Skin is warm and dry.   Psychiatric: He has a normal mood and affect. His behavior is normal.       ekg normal sinus rhythm    Assessment:       1. Chest pain, unspecified type    2. BMI (body mass index) pediatric, > 99% for age, obese child, tertiary care intervention    3. Mild intermittent asthma without complication    4. Chest pain    5. Family history of coronary artery disease         Plan:       -atypical symptoms, plan for baseline stress and echo  -encouraged diet and exercise as tolerated    Return to clinic in 1 month with testing now

## 2018-10-08 NOTE — LETTER
October 8, 2018      Ymael Myers MD  [address]   [address]  [place] [address]           [facility]  [address] [facility] [address]  [place] [address]  Phone: [phone]          Patient: José mcnair   MR Number: [id number]   YOB: 2000   Date of Visit: 10/8/2018       Dear Dr. Yamel Myers:    Thank you for referring José mcnair to me for evaluation. Attached you will find relevant portions of my assessment and plan of care.    If you have questions, please do not hesitate to call me. I look forward to following José mcnair along with you.    Sincerely,    Zacarias Wilkinson MD    Enclosure  CC:  No Recipients    If you would like to receive this communication electronically, please contact [email or web address] or [phone] to request more information on [employer] access.    For providers and/or their staff who would like to refer a patient to [facility], please contact us through our one-stop-shop provider referral line, [facility], at [phone].    If you feel you have received this communication in error or would no longer like to receive these types of communications, please e-mail [email or web address]

## 2018-10-29 ENCOUNTER — HOSPITAL ENCOUNTER (OUTPATIENT)
Dept: CARDIOLOGY | Facility: HOSPITAL | Age: 18
Discharge: HOME | End: 2018-10-29
Attending: INTERNAL MEDICINE
Payer: MEDICAID

## 2018-10-29 VITALS — BODY MASS INDEX: 38.25 KG/M2 | WEIGHT: 238 LBS | HEIGHT: 66 IN

## 2018-10-29 DIAGNOSIS — R07.9 CHEST PAIN, UNSPECIFIED TYPE: ICD-10-CM

## 2018-10-29 LAB
AORTIC ROOT ANNULUS: 2.7 CM
ASCENDING AORTA: 2.1 CM
AV MEAN GRADIENT: 4 MMHG
AV PEAK GRADIENT: 8 MMHG
AV VALVE AREA: 3.08 CM2
AV VELOCITY RATIO: 0.79
BSA FOR ECHO PROCEDURE: 2.24 M2
CV ECHO LV RWT: 0.48 CM
CV STRESS BASE HR: 87
DIASTOLIC BLOOD PRESSURE: 96
DOP CALC AO PEAK VEL: 1.4 M/S
DOP CALC AO VTI: 27 CM
DOP CALC LVOT AREA: 4.15 CM2
DOP CALC LVOT DIAMETER: 2.3 CM
DOP CALC LVOT PEAK VEL: 1.1 M/S
DOP CALC LVOT STROKE VOLUME: 83.05 CM3
DOP CALCLVOT PEAK VEL VTI: 20 CM
E WAVE DECELERATION TIME: 184 MSEC
E/A RATIO: 2
ECHO LV POSTERIOR WALL: 1.1 CM (ref 0.6–1.1)
FRACTIONAL SHORTENING: 39 % (ref 28–44)
INTERVENTRICULAR SEPTUM: 1 CM (ref 0.6–1.1)
IVRT: 83 MSEC
LEFT ATRIUM SIZE: 3.6 CM
LEFT INTERNAL DIMENSION IN SYSTOLE: 2.7 CM (ref 2.1–4)
LEFT VENTRICLE MASS INDEX: 70.6 G/M2
LEFT VENTRICULAR INTERNAL DIMENSION IN DIASTOLE: 4.4 CM (ref 3.5–6)
LEFT VENTRICULAR MASS: 158.21 G
MV PEAK A VEL: 0.4 M/S
MV PEAK E VEL: 0.8 M/S
MV STENOSIS PRESSURE HALF TIME: 35 MS
MV VALVE AREA P 1/2 METHOD: 6.29 CM2
OHS CV CPX 1 MINUTE RECOVERY HEART RATE: 133 BPM
OHS CV CPX 85 PERCENT MAX PREDICTED HEART RATE MALE: 170
OHS CV CPX ESTIMATED METS: 9
OHS CV CPX MAX PREDICTED HEART RATE: 200
OHS CV CPX PATIENT IS FEMALE: 0
OHS CV CPX PATIENT IS MALE: 1
OHS CV CPX PEAK DIASTOLIC BLOOD PRESSURE: 80 MMHG
OHS CV CPX PEAK HEAR RATE: 176
OHS CV CPX PEAK RATE PRESSURE PRODUCT: NORMAL
OHS CV CPX PEAK SYSTOLIC BLOOD PRESSURE: 200
OHS CV CPX PERCENT MAX PREDICTED HEART RATE ACHIEVED: 88
OHS CV CPX PERCENT TARGET HEART RATE ACHIEVED: 103.53
OHS CV CPX RATE PRESSURE PRODUCT PRESENTING: NORMAL
OHS CV CPX TARGET HEART RATE: 170
PISA TR MAX VEL: 2.27 M/S
PULM VEIN S/D RATIO: 1.25
PV PEAK D VEL: 0.4 M/S
PV PEAK GRADIENT: 5 MMHG
PV PEAK S VEL: 0.5 M/S
RA PRESSURE: 8 MMHG
SINUS: 2.7 CM
STJ: 2.1 CM
STRESS ANGINA INDEX: 0
STRESS ECHO POST EXERCISE DUR MIN: 7 MIN
STRESS ECHO POST EXERCISE DUR SEC: 21
SYSTOLIC BLOOD PRESSURE: 148
TR MAX PG: 20.61 MMHG
TV REST PULMONARY ARTERY PRESSURE: 28.61 MMHG

## 2018-10-29 PROCEDURE — 93018 CV STRESS TEST I&R ONLY: CPT | Mod: ,,, | Performed by: INTERNAL MEDICINE

## 2018-10-29 PROCEDURE — 93306 TTE W/DOPPLER COMPLETE: CPT | Mod: 26,,, | Performed by: INTERNAL MEDICINE

## 2018-10-29 PROCEDURE — 93016 CV STRESS TEST SUPVJ ONLY: CPT | Mod: ,,, | Performed by: INTERNAL MEDICINE

## 2018-10-29 PROCEDURE — 93017 CV STRESS TEST TRACING ONLY: CPT

## 2018-10-29 PROCEDURE — 93306 TTE W/DOPPLER COMPLETE: CPT

## 2018-11-14 ENCOUNTER — OFFICE VISIT (OUTPATIENT)
Dept: CARDIOLOGY | Facility: CLINIC | Age: 18
End: 2018-11-14
Payer: MEDICAID

## 2018-11-14 VITALS
WEIGHT: 235.88 LBS | HEART RATE: 69 BPM | RESPIRATION RATE: 20 BRPM | SYSTOLIC BLOOD PRESSURE: 122 MMHG | BODY MASS INDEX: 38.07 KG/M2 | DIASTOLIC BLOOD PRESSURE: 84 MMHG | OXYGEN SATURATION: 92 %

## 2018-11-14 DIAGNOSIS — R07.9 CHEST PAIN, UNSPECIFIED TYPE: Primary | ICD-10-CM

## 2018-11-14 DIAGNOSIS — E66.9 BMI (BODY MASS INDEX) PEDIATRIC, > 99% FOR AGE, OBESE CHILD, TERTIARY CARE INTERVENTION: ICD-10-CM

## 2018-11-14 DIAGNOSIS — J45.20 MILD INTERMITTENT ASTHMA WITHOUT COMPLICATION: ICD-10-CM

## 2018-11-14 PROCEDURE — 99213 OFFICE O/P EST LOW 20 MIN: CPT | Mod: PBBFAC,PO | Performed by: INTERNAL MEDICINE

## 2018-11-14 PROCEDURE — 99214 OFFICE O/P EST MOD 30 MIN: CPT | Mod: S$PBB,,, | Performed by: INTERNAL MEDICINE

## 2018-11-14 PROCEDURE — 99999 PR PBB SHADOW E&M-EST. PATIENT-LVL III: CPT | Mod: PBBFAC,,, | Performed by: INTERNAL MEDICINE

## 2018-11-14 NOTE — PROGRESS NOTES
Subjective:    Patient ID:  José Aranda is a 18 y.o. male who presents for follow-up of Results      HPI  Patient is here for follow-up of chest pain.  He denies any worsening cardiopulmonary complaints.  He still has occasional brief episodes of chest pain.  He cannot find a trigger for it and has not tried anything makes it better or worse.  He had some brief diagnostic testing as below.  This was within normal limits.  He denies any PND, orthopnea or lower extremity edema.  He has experiencing dizziness, presyncope or syncope.    Review of Systems   Constitution: Negative.   HENT: Negative.    Eyes: Negative.    Cardiovascular: Negative for chest pain, dyspnea on exertion, irregular heartbeat, leg swelling, near-syncope, orthopnea, palpitations, paroxysmal nocturnal dyspnea and syncope.   Respiratory: Negative for shortness of breath.    Skin: Negative.    Musculoskeletal: Negative.    Gastrointestinal: Negative for abdominal pain, constipation and diarrhea.   Genitourinary: Negative for dysuria.   Neurological: Negative for dizziness.   Psychiatric/Behavioral: Negative.         Objective:    Physical Exam   Constitutional: He is oriented to person, place, and time. He appears well-developed and well-nourished. No distress.   HENT:   Head: Normocephalic and atraumatic.   Eyes: Conjunctivae and EOM are normal. Pupils are equal, round, and reactive to light.   Neck: Normal range of motion. Neck supple. No thyromegaly present.   Cardiovascular: Normal rate, regular rhythm and normal heart sounds.   No murmur heard.  Pulmonary/Chest: Effort normal and breath sounds normal. No respiratory distress. He has no wheezes. He has no rales. He exhibits no tenderness.   Abdominal: Soft. Bowel sounds are normal.   Musculoskeletal: He exhibits no edema.   Neurological: He is alert and oriented to person, place, and time.   Skin: Skin is warm and dry.   Psychiatric: He has a normal mood and affect. His behavior is  normal.         TTE:  11-18  · Left ventricle ejection fraction is low normal at 50%  · No wall motion abnormalities.  · Left ventricle shows concentric remodeling.  · Trace tricuspid regurgitation.  · The estimated PA systolic pressure is 28.61 mm Hg     GXT within normal limits * ran for 7.2 min achieving 9 Mets without symptoms    Assessment:       1. Chest pain, unspecified type    2. BMI (body mass index) pediatric, > 99% for age, obese child, tertiary care intervention    3. Mild intermittent asthma without complication         Plan:       -mainly reassurance from cardiovascular standpoint  -encouraged diet and exercise tolerated  -trial of antacids etc.    Return to clinic as needed